# Patient Record
Sex: FEMALE | Race: BLACK OR AFRICAN AMERICAN | Employment: FULL TIME | URBAN - METROPOLITAN AREA
[De-identification: names, ages, dates, MRNs, and addresses within clinical notes are randomized per-mention and may not be internally consistent; named-entity substitution may affect disease eponyms.]

---

## 2024-03-14 NOTE — PROGRESS NOTES
Adelia Balderas is a 63 y.o. female returns for an annual exam     Chief Complaint   Patient presents with    Annual Exam       No LMP recorded. Patient is postmenopausal.  Her periods are absent in flow.  Problems: no problems  Birth Control: post menopausal status.  Last Pap: normal obtained 9/23/2020.  She does not have a history of NILDA 2, 3 or cervical cancer.   Last Mammogram: has not had a recent mammogram.     Last Bone Density: abnormal Osteopenia obtained 11/12/2020.  Last colonoscopy: abnormal polyps obtained 2021.      1. Have you been to the ER, urgent care clinic, or hospitalized since your last visit? N/A NP      2. Have you seen or consulted any other health care providers outside of the [facility] since your last visit? N/A NP     Examination chaperoned by NICHOLE HERNANDEZ CMA.

## 2024-03-18 ENCOUNTER — OFFICE VISIT (OUTPATIENT)
Age: 64
End: 2024-03-18
Payer: COMMERCIAL

## 2024-03-18 VITALS
WEIGHT: 155.4 LBS | HEIGHT: 62 IN | DIASTOLIC BLOOD PRESSURE: 81 MMHG | SYSTOLIC BLOOD PRESSURE: 133 MMHG | BODY MASS INDEX: 28.6 KG/M2

## 2024-03-18 DIAGNOSIS — Z01.419 WELL WOMAN EXAM WITH ROUTINE GYNECOLOGICAL EXAM: Primary | ICD-10-CM

## 2024-03-18 DIAGNOSIS — Z78.0 OSTEOPENIA AFTER MENOPAUSE: ICD-10-CM

## 2024-03-18 DIAGNOSIS — Z12.31 BREAST CANCER SCREENING BY MAMMOGRAM: ICD-10-CM

## 2024-03-18 DIAGNOSIS — M85.80 OSTEOPENIA AFTER MENOPAUSE: ICD-10-CM

## 2024-03-18 PROCEDURE — 99387 INIT PM E/M NEW PAT 65+ YRS: CPT | Performed by: OBSTETRICS & GYNECOLOGY

## 2024-03-18 RX ORDER — AMLODIPINE BESYLATE 5 MG/1
5 TABLET ORAL DAILY
COMMUNITY
Start: 2024-03-07

## 2024-03-18 NOTE — PROGRESS NOTES
ANNUAL EXAM 65+    History:  Adelia Balderas is a 63 y.o. year old  Black /  female   No LMP recorded. Patient is postmenopausal.    She presents for her annual checkup.     No LMP recorded. Patient is postmenopausal.  No PMB.   No hot flashes & sweats  Her periods are absent in flow.  Problems: no problems  Birth Control: post menopausal status.  Last Pap: normal obtained 2020.  She does not have a history of NILDA 2, 3 or cervical cancer.   Last Mammogram: has not had a recent mammogram.     Last Bone Density: abnormal Osteopenia obtained 2020.  Last colonoscopy: abnormal polyps obtained .    Medical History:  Problem List:  There are no problems to display for this patient.    Past Medical History:   Diagnosis Date    Diverticulitis 2006    Fibroid uterus     16 week 3/08 2.6, 2.2, 1.9, 1.7    Genital herpes 2003    +IgG2    Hypertension     Osteopenia     T-1.0 hip   T-1.7 spine, T-1.7 hip    Vitamin D deficiency 2010     Past Surgical History:   Procedure Laterality Date    BREAST BIOPSY Left     CHOLECYSTECTOMY, LAPAROSCOPIC  2020    COLONOSCOPY W/ POLYPECTOMY  2009 - polyps, 2015 hyperplastic polyp  2018 Polyp      DILATION AND EVACUATION  2000    18w oligo (due to ACE inhibitor use)    FOOT SURGERY         OB History    Para Term  AB Living   1 0 0 0 1 0   SAB IAB Ectopic Molar Multiple Live Births   1 0 0 0 0 0      # Outcome Date GA Lbr Sonny/2nd Weight Sex Delivery Anes PTL Lv   1 SAB 00 18w0d       FD      Birth Comments: severe oligo thought due to ACE inhibitor use     Social History     Socioeconomic History    Marital status:      Spouse name: None    Number of children: 0    Years of education: None    Highest education level: None   Occupational History    Occupation: Finger print     Comment: [employer]   Tobacco Use    Smoking status: Never    Smokeless tobacco: Never